# Patient Record
Sex: FEMALE
[De-identification: names, ages, dates, MRNs, and addresses within clinical notes are randomized per-mention and may not be internally consistent; named-entity substitution may affect disease eponyms.]

---

## 2020-08-05 ENCOUNTER — HOSPITAL ENCOUNTER (OUTPATIENT)
Dept: HOSPITAL 88 - RAD | Age: 50
End: 2020-08-05
Attending: INTERNAL MEDICINE
Payer: COMMERCIAL

## 2020-08-05 DIAGNOSIS — U07.1: Primary | ICD-10-CM

## 2020-08-05 DIAGNOSIS — Z01.818: ICD-10-CM

## 2020-08-05 DIAGNOSIS — R12: ICD-10-CM

## 2020-08-05 DIAGNOSIS — K59.00: ICD-10-CM

## 2020-08-05 DIAGNOSIS — Z53.8: ICD-10-CM

## 2020-08-05 DIAGNOSIS — R13.10: ICD-10-CM

## 2020-08-05 LAB
BASOPHILS # BLD AUTO: 0 10*3/UL (ref 0–0.1)
BASOPHILS NFR BLD AUTO: 0.5 % (ref 0–1)
DEPRECATED NEUTROPHILS # BLD AUTO: 3.3 10*3/UL (ref 2.1–6.9)
EOSINOPHIL # BLD AUTO: 0.1 10*3/UL (ref 0–0.4)
EOSINOPHIL NFR BLD AUTO: 0.9 % (ref 0–6)
ERYTHROCYTE [DISTWIDTH] IN CORD BLOOD: 13.3 % (ref 11.7–14.4)
HCT VFR BLD AUTO: 26.5 % (ref 34.2–44.1)
HGB BLD-MCNC: 8.7 G/DL (ref 12–16)
LYMPHOCYTES # BLD: 1.6 10*3/UL (ref 1–3.2)
LYMPHOCYTES NFR BLD AUTO: 28.7 % (ref 18–39.1)
MCH RBC QN AUTO: 29.5 PG (ref 28–32)
MCHC RBC AUTO-ENTMCNC: 32.8 G/DL (ref 31–35)
MCV RBC AUTO: 89.8 FL (ref 81–99)
MONOCYTES # BLD AUTO: 0.6 10*3/UL (ref 0.2–0.8)
MONOCYTES NFR BLD AUTO: 10.6 % (ref 4.4–11.3)
NEUTS SEG NFR BLD AUTO: 59.1 % (ref 38.7–80)
PLATELET # BLD AUTO: 384 X10E3/UL (ref 140–360)
RBC # BLD AUTO: 2.95 X10E6/UL (ref 3.6–5.1)

## 2020-08-05 PROCEDURE — 93005 ELECTROCARDIOGRAM TRACING: CPT

## 2020-08-05 PROCEDURE — 36415 COLL VENOUS BLD VENIPUNCTURE: CPT

## 2020-08-05 PROCEDURE — 85025 COMPLETE CBC W/AUTO DIFF WBC: CPT

## 2020-08-24 ENCOUNTER — HOSPITAL ENCOUNTER (OUTPATIENT)
Dept: HOSPITAL 88 - ENDO | Age: 50
Discharge: HOME | End: 2020-08-24
Attending: INTERNAL MEDICINE
Payer: COMMERCIAL

## 2020-08-24 VITALS — SYSTOLIC BLOOD PRESSURE: 132 MMHG | DIASTOLIC BLOOD PRESSURE: 74 MMHG

## 2020-08-24 DIAGNOSIS — E11.9: ICD-10-CM

## 2020-08-24 DIAGNOSIS — I10: ICD-10-CM

## 2020-08-24 DIAGNOSIS — E78.5: ICD-10-CM

## 2020-08-24 DIAGNOSIS — K20.9: Primary | ICD-10-CM

## 2020-08-24 DIAGNOSIS — B96.81: ICD-10-CM

## 2020-08-24 DIAGNOSIS — I69.354: ICD-10-CM

## 2020-08-24 DIAGNOSIS — K21.9: ICD-10-CM

## 2020-08-24 DIAGNOSIS — K59.09: ICD-10-CM

## 2020-08-24 DIAGNOSIS — K29.50: ICD-10-CM

## 2020-08-24 DIAGNOSIS — Z79.02: ICD-10-CM

## 2020-08-24 DIAGNOSIS — Z79.82: ICD-10-CM

## 2020-08-24 PROCEDURE — 82948 REAGENT STRIP/BLOOD GLUCOSE: CPT

## 2020-08-24 PROCEDURE — 81025 URINE PREGNANCY TEST: CPT

## 2020-08-24 PROCEDURE — 36415 COLL VENOUS BLD VENIPUNCTURE: CPT

## 2020-08-24 PROCEDURE — 43239 EGD BIOPSY SINGLE/MULTIPLE: CPT

## 2020-08-24 PROCEDURE — 43450 DILATE ESOPHAGUS 1/MULT PASS: CPT

## 2020-08-24 NOTE — OPERATIVE REPORT
DATE OF PROCEDURE:  08/24/2020

 

SURGEON:  Alfred Cherry MD

 

PROCEDURE:  EGD with esophageal dilatation and biopsies.

 

INDICATIONS FOR EGD:  Dysphagia to solids, acid reflux, bloating.

 

MEDICATIONS:  The patient was done under MAC, please see anesthesiologist's note.

 

PROCEDURE IN DETAIL:  With the patient in the left lateral decubitus position, a

flexible fiberoptic Olympus gastroscope was introduced into the esophagus under direct

visualization without any difficulty.  There was some patchy erythema noted in distal

esophagus.  Esophagus was then dilated to size 54-[place] Price.  The scope was then

advanced with ease into the stomach.  Mucosa overlying the antrum and the body revealed

some patchy erythema and mild-to-moderate edema, and biopsies were obtained and sent to

stain for H. pylori.  Pylorus was of normal contour and shape, was intubated with ease

and the scope was advanced all the way to the second portion of the duodenum.  Biopsies

were obtained from the proximal second portion and duodenal bulb to rule out sprue.  The

scope was then withdrawn back into the stomach and retroflexed, mucosa overlying the

fundus and the cardia appeared to be within normal limits.  The scope was then

straightened out, it was subsequently withdrawn, and the patient tolerated the procedure

well. 

 

IMPRESSION:  

1. Distal esophagitis.

2. Esophagus dilated to size 54-M[place] Price.

3. Gastritis, biopsied, biopsies sent to stain for Helicobacter pylori.

4. Rule out sprue.

 

PLAN:  Follow up histology.  Initiate Protonix 40 mg one p.o. q.a.m. before meals.

 

 

 

 

______________________________

Alfred Cherry MD

 

[facility] [place] [place]

D:  08/24/2020 08:46:45

T:  08/24/2020 09:01:44

Job #:  [id number]

 

cc:            Kyle Iyer III, MD

## 2020-11-27 ENCOUNTER — HOSPITAL ENCOUNTER (OUTPATIENT)
Dept: HOSPITAL 88 - PT | Age: 50
LOS: 3 days | End: 2020-11-30
Attending: SPECIALIST
Payer: COMMERCIAL

## 2020-11-27 DIAGNOSIS — R53.1: ICD-10-CM

## 2020-11-27 DIAGNOSIS — I69.954: Primary | ICD-10-CM

## 2020-11-27 DIAGNOSIS — R27.9: ICD-10-CM

## 2020-11-27 PROCEDURE — 97112 NEUROMUSCULAR REEDUCATION: CPT

## 2020-11-27 PROCEDURE — 97110 THERAPEUTIC EXERCISES: CPT

## 2020-11-27 PROCEDURE — 97162 PT EVAL MOD COMPLEX 30 MIN: CPT

## 2020-11-27 PROCEDURE — 97140 MANUAL THERAPY 1/> REGIONS: CPT

## 2020-11-27 PROCEDURE — 97165 OT EVAL LOW COMPLEX 30 MIN: CPT

## 2020-12-16 ENCOUNTER — HOSPITAL ENCOUNTER (OUTPATIENT)
Dept: HOSPITAL 88 - OR | Age: 50
Discharge: HOME | End: 2020-12-16
Attending: INTERNAL MEDICINE
Payer: COMMERCIAL

## 2020-12-16 VITALS — DIASTOLIC BLOOD PRESSURE: 71 MMHG | SYSTOLIC BLOOD PRESSURE: 121 MMHG

## 2020-12-16 DIAGNOSIS — D64.9: ICD-10-CM

## 2020-12-16 DIAGNOSIS — K20.90: ICD-10-CM

## 2020-12-16 DIAGNOSIS — Z20.828: ICD-10-CM

## 2020-12-16 DIAGNOSIS — Z88.6: ICD-10-CM

## 2020-12-16 DIAGNOSIS — Z79.02: ICD-10-CM

## 2020-12-16 DIAGNOSIS — Z79.84: ICD-10-CM

## 2020-12-16 DIAGNOSIS — I69.354: ICD-10-CM

## 2020-12-16 DIAGNOSIS — K64.8: ICD-10-CM

## 2020-12-16 DIAGNOSIS — Z01.812: ICD-10-CM

## 2020-12-16 DIAGNOSIS — E11.9: ICD-10-CM

## 2020-12-16 DIAGNOSIS — D12.5: ICD-10-CM

## 2020-12-16 DIAGNOSIS — I50.9: ICD-10-CM

## 2020-12-16 DIAGNOSIS — I11.0: ICD-10-CM

## 2020-12-16 DIAGNOSIS — K29.70: Primary | ICD-10-CM

## 2020-12-16 DIAGNOSIS — Z01.810: ICD-10-CM

## 2020-12-16 DIAGNOSIS — D12.3: ICD-10-CM

## 2020-12-16 PROCEDURE — 93005 ELECTROCARDIOGRAM TRACING: CPT

## 2020-12-16 PROCEDURE — 45380 COLONOSCOPY AND BIOPSY: CPT

## 2020-12-16 PROCEDURE — 45385 COLONOSCOPY W/LESION REMOVAL: CPT

## 2020-12-16 PROCEDURE — 45378 DIAGNOSTIC COLONOSCOPY: CPT

## 2020-12-16 PROCEDURE — 45384 COLONOSCOPY W/LESION REMOVAL: CPT

## 2020-12-16 PROCEDURE — 82948 REAGENT STRIP/BLOOD GLUCOSE: CPT

## 2020-12-16 PROCEDURE — 36415 COLL VENOUS BLD VENIPUNCTURE: CPT

## 2020-12-16 PROCEDURE — 43239 EGD BIOPSY SINGLE/MULTIPLE: CPT

## 2020-12-28 ENCOUNTER — HOSPITAL ENCOUNTER (OUTPATIENT)
Dept: HOSPITAL 88 - PT | Age: 50
LOS: 3 days | End: 2020-12-31
Attending: SPECIALIST
Payer: COMMERCIAL

## 2020-12-28 DIAGNOSIS — M54.6: ICD-10-CM

## 2020-12-28 DIAGNOSIS — R26.2: ICD-10-CM

## 2020-12-28 DIAGNOSIS — I69.954: Primary | ICD-10-CM

## 2020-12-28 DIAGNOSIS — M54.5: ICD-10-CM

## 2020-12-28 DIAGNOSIS — M62.81: ICD-10-CM

## 2020-12-28 PROCEDURE — 97139 UNLISTED THERAPEUTIC PX: CPT

## 2020-12-28 PROCEDURE — 97112 NEUROMUSCULAR REEDUCATION: CPT

## 2020-12-28 PROCEDURE — 97140 MANUAL THERAPY 1/> REGIONS: CPT

## 2020-12-28 PROCEDURE — 97110 THERAPEUTIC EXERCISES: CPT
